# Patient Record
Sex: FEMALE | HISPANIC OR LATINO
[De-identification: names, ages, dates, MRNs, and addresses within clinical notes are randomized per-mention and may not be internally consistent; named-entity substitution may affect disease eponyms.]

---

## 2020-09-21 ENCOUNTER — APPOINTMENT (OUTPATIENT)
Dept: OTOLARYNGOLOGY | Facility: CLINIC | Age: 50
End: 2020-09-21

## 2020-09-21 PROBLEM — Z00.00 ENCOUNTER FOR PREVENTIVE HEALTH EXAMINATION: Status: ACTIVE | Noted: 2020-09-21

## 2022-02-09 ENCOUNTER — APPOINTMENT (OUTPATIENT)
Dept: OTOLARYNGOLOGY | Facility: CLINIC | Age: 52
End: 2022-02-09
Payer: COMMERCIAL

## 2022-02-09 VITALS
DIASTOLIC BLOOD PRESSURE: 83 MMHG | SYSTOLIC BLOOD PRESSURE: 116 MMHG | WEIGHT: 150 LBS | HEIGHT: 60 IN | HEART RATE: 99 BPM | BODY MASS INDEX: 29.45 KG/M2

## 2022-02-09 DIAGNOSIS — R09.82 POSTNASAL DRIP: ICD-10-CM

## 2022-02-09 DIAGNOSIS — L29.9 PRURITUS, UNSPECIFIED: ICD-10-CM

## 2022-02-09 DIAGNOSIS — Z78.9 OTHER SPECIFIED HEALTH STATUS: ICD-10-CM

## 2022-02-09 PROCEDURE — 99203 OFFICE O/P NEW LOW 30 MIN: CPT

## 2022-02-09 RX ORDER — CALCIUM CARBONATE/VITAMIN D3 600 MG-10
TABLET ORAL
Refills: 0 | Status: ACTIVE | COMMUNITY

## 2022-02-09 RX ORDER — FLUTICASONE PROPIONATE 50 UG/1
50 SPRAY, METERED NASAL
Qty: 3 | Refills: 3 | Status: ACTIVE | COMMUNITY
Start: 2022-02-09 | End: 1900-01-01

## 2022-02-09 NOTE — PHYSICAL EXAM
[Midline] : trachea located in midline position [Normal] : no rashes [de-identified] : mild DNS to right slight dried blood

## 2022-02-09 NOTE — REASON FOR VISIT
[Other: ______] : provided by HARI [Initial Evaluation] : an initial evaluation for [Interpreters_IDNumber] : [id number] [Interpreters_FullName] : Rosalba [[id number]] : [place]

## 2022-02-09 NOTE — HISTORY OF PRESENT ILLNESS
[de-identified] : 50 yo F presents with sensation of sand in ear. Reports that two years ago, got sand in the ear and since then has intermittent "crinkling" sensation especially when using ear buds - left side only - 1x per week  feels it when showering -  No change in hearing. No otalgia, otorrhea, tinnitus, dizziness or headaches. Also complains of dry throat, clearing of throat and dry cough 2-3 months throughout the day - stable. COVID negative. Might have allergies - no medication.

## 2023-12-23 ENCOUNTER — OFFICE (OUTPATIENT)
Facility: LOCATION | Age: 53
Setting detail: OPHTHALMOLOGY
End: 2023-12-23
Payer: COMMERCIAL

## 2023-12-23 DIAGNOSIS — D35.2: ICD-10-CM

## 2023-12-23 DIAGNOSIS — H53.431: ICD-10-CM

## 2023-12-23 DIAGNOSIS — H40.011: ICD-10-CM

## 2023-12-23 DIAGNOSIS — H50.10: ICD-10-CM

## 2023-12-23 PROCEDURE — 92004 COMPRE OPH EXAM NEW PT 1/>: CPT | Performed by: OPHTHALMOLOGY

## 2023-12-23 PROCEDURE — 92133 CPTRZD OPH DX IMG PST SGM ON: CPT | Performed by: OPHTHALMOLOGY

## 2023-12-23 PROCEDURE — 92202 OPSCPY EXTND ON/MAC DRAW: CPT | Performed by: OPHTHALMOLOGY

## 2023-12-23 PROCEDURE — 92060 SENSORIMOTOR EXAMINATION: CPT | Performed by: OPHTHALMOLOGY

## 2023-12-23 PROCEDURE — 92083 EXTENDED VISUAL FIELD XM: CPT | Performed by: OPHTHALMOLOGY

## 2023-12-23 ASSESSMENT — REFRACTION_AUTOREFRACTION
OD_CYLINDER: -0.12
OS_SPHERE: -0.12
OS_CYLINDER: -0.37
OS_AXIS: 033
OD_SPHERE: +0.75
OD_AXIS: 050

## 2023-12-23 ASSESSMENT — CONFRONTATIONAL VISUAL FIELD TEST (CVF)
OS_FINDINGS: FULL
OD_FINDINGS: FULL

## 2023-12-23 ASSESSMENT — SPHEQUIV_DERIVED
OS_SPHEQUIV: -0.305
OD_SPHEQUIV: 0.69

## 2024-06-20 ENCOUNTER — OFFICE (OUTPATIENT)
Facility: LOCATION | Age: 54
Setting detail: OPHTHALMOLOGY
End: 2024-06-20
Payer: COMMERCIAL

## 2024-06-20 DIAGNOSIS — D35.2: ICD-10-CM

## 2024-06-20 DIAGNOSIS — H40.013: ICD-10-CM

## 2024-06-20 DIAGNOSIS — H53.431: ICD-10-CM

## 2024-06-20 PROCEDURE — 92250 FUNDUS PHOTOGRAPHY W/I&R: CPT | Performed by: OPHTHALMOLOGY

## 2024-06-20 PROCEDURE — 92083 EXTENDED VISUAL FIELD XM: CPT | Performed by: OPHTHALMOLOGY

## 2024-06-20 PROCEDURE — 92012 INTRM OPH EXAM EST PATIENT: CPT | Performed by: OPHTHALMOLOGY

## 2024-06-20 ASSESSMENT — CONFRONTATIONAL VISUAL FIELD TEST (CVF)
OS_FINDINGS: FULL
OD_FINDINGS: FULL

## 2024-08-19 ENCOUNTER — APPOINTMENT (OUTPATIENT)
Dept: OTOLARYNGOLOGY | Facility: CLINIC | Age: 54
End: 2024-08-19
Payer: COMMERCIAL

## 2024-08-19 VITALS
DIASTOLIC BLOOD PRESSURE: 88 MMHG | BODY MASS INDEX: 31.41 KG/M2 | WEIGHT: 160 LBS | SYSTOLIC BLOOD PRESSURE: 133 MMHG | HEIGHT: 60 IN | HEART RATE: 85 BPM

## 2024-08-19 DIAGNOSIS — H92.12 OTORRHEA, LEFT EAR: ICD-10-CM

## 2024-08-19 DIAGNOSIS — L29.9 PRURITUS, UNSPECIFIED: ICD-10-CM

## 2024-08-19 PROCEDURE — 99213 OFFICE O/P EST LOW 20 MIN: CPT

## 2024-08-19 PROCEDURE — 92557 COMPREHENSIVE HEARING TEST: CPT

## 2024-08-19 PROCEDURE — 92567 TYMPANOMETRY: CPT

## 2024-08-19 NOTE — HISTORY OF PRESENT ILLNESS
[de-identified] : 55 yo F with hx of otorrhea and "rubbing sensation" in left ear presents for further evaluation. Had malodorous otorrhea one day about two months ago and intermittent "rubbing sensation" for past two months. No noticeable change in hearing. No tinnitus, otalgia, otorrhea, dizziness or headaches related to hearing.

## 2024-12-19 ENCOUNTER — OFFICE (OUTPATIENT)
Facility: LOCATION | Age: 54
Setting detail: OPHTHALMOLOGY
End: 2024-12-19
Payer: COMMERCIAL

## 2024-12-19 DIAGNOSIS — H35.033: ICD-10-CM

## 2024-12-19 DIAGNOSIS — H50.10: ICD-10-CM

## 2024-12-19 DIAGNOSIS — H40.013: ICD-10-CM

## 2024-12-19 DIAGNOSIS — H25.13: ICD-10-CM

## 2024-12-19 DIAGNOSIS — H53.431: ICD-10-CM

## 2024-12-19 DIAGNOSIS — D35.2: ICD-10-CM

## 2024-12-19 PROCEDURE — 92083 EXTENDED VISUAL FIELD XM: CPT | Performed by: OPHTHALMOLOGY

## 2024-12-19 PROCEDURE — 92133 CPTRZD OPH DX IMG PST SGM ON: CPT | Performed by: OPHTHALMOLOGY

## 2024-12-19 PROCEDURE — 92014 COMPRE OPH EXAM EST PT 1/>: CPT | Performed by: OPHTHALMOLOGY

## 2024-12-19 PROCEDURE — 92060 SENSORIMOTOR EXAMINATION: CPT | Performed by: OPHTHALMOLOGY

## 2024-12-19 PROCEDURE — 92201 OPSCPY EXTND RTA DRAW UNI/BI: CPT | Performed by: OPHTHALMOLOGY

## 2024-12-19 ASSESSMENT — KERATOMETRY
OD_K2POWER_DIOPTERS: 43.75
OD_AXISANGLE_DEGREES: 094
OD_K1POWER_DIOPTERS: 43.00
OS_K2POWER_DIOPTERS: 43.25
OS_K1POWER_DIOPTERS: 43.00
OS_AXISANGLE_DEGREES: 097

## 2024-12-19 ASSESSMENT — REFRACTION_AUTOREFRACTION
OS_CYLINDER: -0.50
OS_SPHERE: +0.50
OS_AXIS: 079
OD_SPHERE: +1.00
OD_AXIS: 67
OD_CYLINDER: -0.50

## 2024-12-19 ASSESSMENT — CONFRONTATIONAL VISUAL FIELD TEST (CVF)
OD_FINDINGS: FULL
OS_FINDINGS: FULL

## 2024-12-19 ASSESSMENT — VISUAL ACUITY
OD_BCVA: 20/20-1
OS_BCVA: 20/20

## 2025-06-19 ENCOUNTER — OFFICE (OUTPATIENT)
Facility: LOCATION | Age: 55
Setting detail: OPHTHALMOLOGY
End: 2025-06-19
Payer: COMMERCIAL

## 2025-06-19 DIAGNOSIS — D35.2: ICD-10-CM

## 2025-06-19 DIAGNOSIS — H25.13: ICD-10-CM

## 2025-06-19 DIAGNOSIS — H50.10: ICD-10-CM

## 2025-06-19 DIAGNOSIS — H40.013: ICD-10-CM

## 2025-06-19 DIAGNOSIS — H53.431: ICD-10-CM

## 2025-06-19 PROCEDURE — 92083 EXTENDED VISUAL FIELD XM: CPT | Performed by: OPHTHALMOLOGY

## 2025-06-19 PROCEDURE — 92012 INTRM OPH EXAM EST PATIENT: CPT | Performed by: OPHTHALMOLOGY

## 2025-06-19 PROCEDURE — 92133 CPTRZD OPH DX IMG PST SGM ON: CPT | Performed by: OPHTHALMOLOGY

## 2025-06-19 PROCEDURE — 92060 SENSORIMOTOR EXAMINATION: CPT | Performed by: OPHTHALMOLOGY

## 2025-06-19 ASSESSMENT — KERATOMETRY
METHOD_AUTO_MANUAL: AUTO
OD_K2POWER_DIOPTERS: 43.75
OS_K2POWER_DIOPTERS: 43.50
OD_AXISANGLE_DEGREES: 101
OD_K1POWER_DIOPTERS: 42.75
OS_K1POWER_DIOPTERS: 43.00
OS_AXISANGLE_DEGREES: 095

## 2025-06-19 ASSESSMENT — CONFRONTATIONAL VISUAL FIELD TEST (CVF)
OD_FINDINGS: FULL
OS_FINDINGS: FULL

## 2025-06-19 ASSESSMENT — REFRACTION_AUTOREFRACTION
OS_AXIS: 078
OD_SPHERE: +1.00
OD_AXIS: 067
OS_CYLINDER: -0.50
OS_SPHERE: +0.50
OD_CYLINDER: -0.50

## 2025-06-19 ASSESSMENT — VISUAL ACUITY
OD_BCVA: 20/20
OS_BCVA: 20/20